# Patient Record
Sex: MALE | Race: WHITE | NOT HISPANIC OR LATINO | Employment: FULL TIME | URBAN - METROPOLITAN AREA
[De-identification: names, ages, dates, MRNs, and addresses within clinical notes are randomized per-mention and may not be internally consistent; named-entity substitution may affect disease eponyms.]

---

## 2022-06-16 ENCOUNTER — TRANSFERRED RECORDS (OUTPATIENT)
Dept: HEALTH INFORMATION MANAGEMENT | Facility: CLINIC | Age: 42
End: 2022-06-16

## 2022-06-22 ENCOUNTER — TRANSFERRED RECORDS (OUTPATIENT)
Dept: HEALTH INFORMATION MANAGEMENT | Facility: CLINIC | Age: 42
End: 2022-06-22

## 2022-06-22 LAB
CREATININE (EXTERNAL): 1 MG/DL (ref 0.73–1.18)
GFR ESTIMATED (EXTERNAL): >60 ML/MIN/1.73M2
GLUCOSE (EXTERNAL): 94 MG/DL (ref 70–100)
POTASSIUM (EXTERNAL): 3.9 MMOL/L (ref 3.5–5.1)

## 2022-06-24 RX ORDER — ASPIRIN 325 MG
325 TABLET ORAL DAILY
Status: ON HOLD | COMMUNITY
End: 2022-06-30

## 2022-06-24 RX ORDER — COVID-19 ANTIGEN TEST
440 KIT MISCELLANEOUS DAILY PRN
Status: ON HOLD | COMMUNITY
End: 2022-06-30

## 2022-06-28 ENCOUNTER — ANESTHESIA EVENT (OUTPATIENT)
Dept: SURGERY | Facility: CLINIC | Age: 42
DRG: 460 | End: 2022-06-28
Payer: COMMERCIAL

## 2022-06-29 ENCOUNTER — APPOINTMENT (OUTPATIENT)
Dept: RADIOLOGY | Facility: CLINIC | Age: 42
DRG: 460 | End: 2022-06-29
Attending: ORTHOPAEDIC SURGERY
Payer: COMMERCIAL

## 2022-06-29 ENCOUNTER — ANESTHESIA (OUTPATIENT)
Dept: SURGERY | Facility: CLINIC | Age: 42
DRG: 460 | End: 2022-06-29
Payer: COMMERCIAL

## 2022-06-29 ENCOUNTER — HOSPITAL ENCOUNTER (INPATIENT)
Facility: CLINIC | Age: 42
LOS: 1 days | Discharge: HOME | DRG: 460 | End: 2022-06-30
Attending: ORTHOPAEDIC SURGERY | Admitting: ORTHOPAEDIC SURGERY
Payer: COMMERCIAL

## 2022-06-29 PROBLEM — M54.16 LUMBAR RADICULOPATHY: Status: ACTIVE | Noted: 2022-06-29

## 2022-06-29 PROCEDURE — 250N000011 HC RX IP 250 OP 636: Performed by: ORTHOPAEDIC SURGERY

## 2022-06-29 PROCEDURE — 999N000182 XR SURGERY CARM FLUORO GREATER THAN 5 MIN

## 2022-06-29 PROCEDURE — 87070 CULTURE OTHR SPECIMN AEROBIC: CPT | Performed by: ORTHOPAEDIC SURGERY

## 2022-06-29 PROCEDURE — 99222 1ST HOSP IP/OBS MODERATE 55: CPT | Performed by: FAMILY MEDICINE

## 2022-06-29 PROCEDURE — 250N000011 HC RX IP 250 OP 636: Performed by: ANESTHESIOLOGY

## 2022-06-29 PROCEDURE — 370N000017 HC ANESTHESIA TECHNICAL FEE, PER MIN: Performed by: ORTHOPAEDIC SURGERY

## 2022-06-29 PROCEDURE — 250N000005 HC OR RX SURGIFLO HEMOSTATIC MATRIX 10ML 199102S OPNP: Performed by: ORTHOPAEDIC SURGERY

## 2022-06-29 PROCEDURE — 250N000011 HC RX IP 250 OP 636: Performed by: NURSE ANESTHETIST, CERTIFIED REGISTERED

## 2022-06-29 PROCEDURE — 120N000001 HC R&B MED SURG/OB

## 2022-06-29 PROCEDURE — 0SG30A0 FUSION OF LUMBOSACRAL JOINT WITH INTERBODY FUSION DEVICE, ANTERIOR APPROACH, ANTERIOR COLUMN, OPEN APPROACH: ICD-10-PCS | Performed by: ORTHOPAEDIC SURGERY

## 2022-06-29 PROCEDURE — L8699 PROSTHETIC IMPLANT NOS: HCPCS | Performed by: ORTHOPAEDIC SURGERY

## 2022-06-29 PROCEDURE — C1713 ANCHOR/SCREW BN/BN,TIS/BN: HCPCS | Performed by: ORTHOPAEDIC SURGERY

## 2022-06-29 PROCEDURE — C1762 CONN TISS, HUMAN(INC FASCIA): HCPCS | Performed by: ORTHOPAEDIC SURGERY

## 2022-06-29 PROCEDURE — 710N000010 HC RECOVERY PHASE 1, LEVEL 2, PER MIN: Performed by: ORTHOPAEDIC SURGERY

## 2022-06-29 PROCEDURE — 360N000086 HC SURGERY LEVEL 6 W/ FLUORO, PER MIN: Performed by: ORTHOPAEDIC SURGERY

## 2022-06-29 PROCEDURE — C9290 INJ, BUPIVACAINE LIPOSOME: HCPCS | Performed by: ANESTHESIOLOGY

## 2022-06-29 PROCEDURE — 272N000001 HC OR GENERAL SUPPLY STERILE: Performed by: ORTHOPAEDIC SURGERY

## 2022-06-29 PROCEDURE — 999N000063 XR ABDOMEN PORT 1 VIEWS

## 2022-06-29 PROCEDURE — 258N000003 HC RX IP 258 OP 636: Performed by: ANESTHESIOLOGY

## 2022-06-29 PROCEDURE — 0SB40ZZ EXCISION OF LUMBOSACRAL DISC, OPEN APPROACH: ICD-10-PCS | Performed by: ORTHOPAEDIC SURGERY

## 2022-06-29 PROCEDURE — 87075 CULTR BACTERIA EXCEPT BLOOD: CPT | Performed by: ORTHOPAEDIC SURGERY

## 2022-06-29 PROCEDURE — 272N000004 HC RX 272: Performed by: ORTHOPAEDIC SURGERY

## 2022-06-29 PROCEDURE — 999N000141 HC STATISTIC PRE-PROCEDURE NURSING ASSESSMENT: Performed by: ORTHOPAEDIC SURGERY

## 2022-06-29 PROCEDURE — 250N000009 HC RX 250: Performed by: NURSE ANESTHETIST, CERTIFIED REGISTERED

## 2022-06-29 PROCEDURE — 250N000009 HC RX 250: Performed by: ANESTHESIOLOGY

## 2022-06-29 PROCEDURE — 250N000009 HC RX 250: Performed by: ORTHOPAEDIC SURGERY

## 2022-06-29 PROCEDURE — 258N000003 HC RX IP 258 OP 636: Performed by: NURSE ANESTHETIST, CERTIFIED REGISTERED

## 2022-06-29 PROCEDURE — 87205 SMEAR GRAM STAIN: CPT | Performed by: ORTHOPAEDIC SURGERY

## 2022-06-29 PROCEDURE — 250N000025 HC SEVOFLURANE, PER MIN: Performed by: ORTHOPAEDIC SURGERY

## 2022-06-29 PROCEDURE — 258N000003 HC RX IP 258 OP 636: Performed by: ORTHOPAEDIC SURGERY

## 2022-06-29 PROCEDURE — 4A1034Z MONITORING OF CENTRAL NERVOUS ELECTRICAL ACTIVITY, PERCUTANEOUS APPROACH: ICD-10-PCS | Performed by: ORTHOPAEDIC SURGERY

## 2022-06-29 PROCEDURE — 0SP00AZ REMOVAL OF INTERBODY FUSION DEVICE FROM LUMBAR VERTEBRAL JOINT, OPEN APPROACH: ICD-10-PCS | Performed by: ORTHOPAEDIC SURGERY

## 2022-06-29 PROCEDURE — 250N000013 HC RX MED GY IP 250 OP 250 PS 637: Performed by: ORTHOPAEDIC SURGERY

## 2022-06-29 DEVICE — GRAFT BONE INFUSE BMP SM 7510200: Type: IMPLANTABLE DEVICE | Site: ABDOMEN | Status: FUNCTIONAL

## 2022-06-29 DEVICE — BONE CHIPS CANCELLOUS 15CC 1-8MM: Type: IMPLANTABLE DEVICE | Site: ABDOMEN | Status: FUNCTIONAL

## 2022-06-29 DEVICE — SCREW BN 25MM 5.5MM NS ENDOSKELETON TAS SPNE INTRBD FS: Type: IMPLANTABLE DEVICE | Site: ABDOMEN | Status: FUNCTIONAL

## 2022-06-29 DEVICE — INTERBODY FUSION DEVICE  7 DEGREE LARGE 10MM
Type: IMPLANTABLE DEVICE | Site: SPINE LUMBAR | Status: FUNCTIONAL
Brand: ENDOSKELETON® TAS NANOLOCK® SURFACE TECHNOLOGY

## 2022-06-29 RX ORDER — HYDROMORPHONE HCL IN WATER/PF 6 MG/30 ML
0.2 PATIENT CONTROLLED ANALGESIA SYRINGE INTRAVENOUS EVERY 5 MIN PRN
Status: DISCONTINUED | OUTPATIENT
Start: 2022-06-29 | End: 2022-06-29 | Stop reason: HOSPADM

## 2022-06-29 RX ORDER — FENTANYL CITRATE 50 UG/ML
50 INJECTION, SOLUTION INTRAMUSCULAR; INTRAVENOUS
Status: DISCONTINUED | OUTPATIENT
Start: 2022-06-29 | End: 2022-06-29 | Stop reason: HOSPADM

## 2022-06-29 RX ORDER — DEXAMETHASONE SODIUM PHOSPHATE 10 MG/ML
INJECTION, SOLUTION INTRAMUSCULAR; INTRAVENOUS PRN
Status: DISCONTINUED | OUTPATIENT
Start: 2022-06-29 | End: 2022-06-29

## 2022-06-29 RX ORDER — IBUPROFEN 200 MG
400-600 TABLET ORAL EVERY 8 HOURS PRN
Status: ON HOLD | COMMUNITY
End: 2022-06-30

## 2022-06-29 RX ORDER — ACETAMINOPHEN 325 MG/1
650 TABLET ORAL EVERY 4 HOURS PRN
Status: DISCONTINUED | OUTPATIENT
Start: 2022-07-02 | End: 2022-06-30 | Stop reason: HOSPADM

## 2022-06-29 RX ORDER — OXYCODONE HYDROCHLORIDE 5 MG/1
5 TABLET ORAL EVERY 4 HOURS PRN
Status: DISCONTINUED | OUTPATIENT
Start: 2022-06-29 | End: 2022-06-30 | Stop reason: HOSPADM

## 2022-06-29 RX ORDER — BUPIVACAINE HYDROCHLORIDE 2.5 MG/ML
INJECTION, SOLUTION INFILTRATION; PERINEURAL
Status: DISCONTINUED
Start: 2022-06-29 | End: 2022-06-29 | Stop reason: WASHOUT

## 2022-06-29 RX ORDER — LORATADINE 10 MG/1
10 TABLET ORAL DAILY PRN
Status: DISCONTINUED | OUTPATIENT
Start: 2022-06-29 | End: 2022-06-30 | Stop reason: HOSPADM

## 2022-06-29 RX ORDER — ONDANSETRON 2 MG/ML
INJECTION INTRAMUSCULAR; INTRAVENOUS PRN
Status: DISCONTINUED | OUTPATIENT
Start: 2022-06-29 | End: 2022-06-29

## 2022-06-29 RX ORDER — ONDANSETRON 2 MG/ML
4 INJECTION INTRAMUSCULAR; INTRAVENOUS EVERY 30 MIN PRN
Status: DISCONTINUED | OUTPATIENT
Start: 2022-06-29 | End: 2022-06-29 | Stop reason: HOSPADM

## 2022-06-29 RX ORDER — ONDANSETRON 4 MG/1
4 TABLET, ORALLY DISINTEGRATING ORAL EVERY 6 HOURS PRN
Status: DISCONTINUED | OUTPATIENT
Start: 2022-06-29 | End: 2022-06-30 | Stop reason: HOSPADM

## 2022-06-29 RX ORDER — PROCHLORPERAZINE MALEATE 10 MG
10 TABLET ORAL EVERY 6 HOURS PRN
Status: DISCONTINUED | OUTPATIENT
Start: 2022-06-29 | End: 2022-06-30 | Stop reason: HOSPADM

## 2022-06-29 RX ORDER — FENTANYL CITRATE 50 UG/ML
25 INJECTION, SOLUTION INTRAMUSCULAR; INTRAVENOUS EVERY 5 MIN PRN
Status: DISCONTINUED | OUTPATIENT
Start: 2022-06-29 | End: 2022-06-29 | Stop reason: HOSPADM

## 2022-06-29 RX ORDER — KETAMINE HYDROCHLORIDE 10 MG/ML
INJECTION INTRAMUSCULAR; INTRAVENOUS PRN
Status: DISCONTINUED | OUTPATIENT
Start: 2022-06-29 | End: 2022-06-29

## 2022-06-29 RX ORDER — MAGNESIUM HYDROXIDE 1200 MG/15ML
LIQUID ORAL PRN
Status: DISCONTINUED | OUTPATIENT
Start: 2022-06-29 | End: 2022-06-29 | Stop reason: HOSPADM

## 2022-06-29 RX ORDER — CEFAZOLIN SODIUM/WATER 2 G/20 ML
2 SYRINGE (ML) INTRAVENOUS
Status: COMPLETED | OUTPATIENT
Start: 2022-06-29 | End: 2022-06-29

## 2022-06-29 RX ORDER — GABAPENTIN 300 MG/1
300 CAPSULE ORAL
Status: COMPLETED | OUTPATIENT
Start: 2022-06-29 | End: 2022-06-29

## 2022-06-29 RX ORDER — AMOXICILLIN 250 MG
1 CAPSULE ORAL 2 TIMES DAILY
Status: DISCONTINUED | OUTPATIENT
Start: 2022-06-29 | End: 2022-06-30 | Stop reason: HOSPADM

## 2022-06-29 RX ORDER — PROPOFOL 10 MG/ML
INJECTION, EMULSION INTRAVENOUS PRN
Status: DISCONTINUED | OUTPATIENT
Start: 2022-06-29 | End: 2022-06-29

## 2022-06-29 RX ORDER — LIDOCAINE 40 MG/G
CREAM TOPICAL
Status: DISCONTINUED | OUTPATIENT
Start: 2022-06-29 | End: 2022-06-29 | Stop reason: HOSPADM

## 2022-06-29 RX ORDER — NALOXONE HYDROCHLORIDE 0.4 MG/ML
0.4 INJECTION, SOLUTION INTRAMUSCULAR; INTRAVENOUS; SUBCUTANEOUS
Status: DISCONTINUED | OUTPATIENT
Start: 2022-06-29 | End: 2022-06-30 | Stop reason: HOSPADM

## 2022-06-29 RX ORDER — SODIUM CHLORIDE, SODIUM LACTATE, POTASSIUM CHLORIDE, CALCIUM CHLORIDE 600; 310; 30; 20 MG/100ML; MG/100ML; MG/100ML; MG/100ML
INJECTION, SOLUTION INTRAVENOUS CONTINUOUS
Status: DISCONTINUED | OUTPATIENT
Start: 2022-06-29 | End: 2022-06-29 | Stop reason: HOSPADM

## 2022-06-29 RX ORDER — ONDANSETRON 2 MG/ML
4 INJECTION INTRAMUSCULAR; INTRAVENOUS EVERY 6 HOURS PRN
Status: DISCONTINUED | OUTPATIENT
Start: 2022-06-29 | End: 2022-06-30 | Stop reason: HOSPADM

## 2022-06-29 RX ORDER — SODIUM CHLORIDE 9 MG/ML
INJECTION, SOLUTION INTRAVENOUS CONTINUOUS
Status: DISCONTINUED | OUTPATIENT
Start: 2022-06-29 | End: 2022-06-30 | Stop reason: HOSPADM

## 2022-06-29 RX ORDER — LIDOCAINE HYDROCHLORIDE 10 MG/ML
INJECTION, SOLUTION INFILTRATION; PERINEURAL PRN
Status: DISCONTINUED | OUTPATIENT
Start: 2022-06-29 | End: 2022-06-29

## 2022-06-29 RX ORDER — MEPERIDINE HYDROCHLORIDE 25 MG/ML
12.5 INJECTION INTRAMUSCULAR; INTRAVENOUS; SUBCUTANEOUS
Status: DISCONTINUED | OUTPATIENT
Start: 2022-06-29 | End: 2022-06-29 | Stop reason: HOSPADM

## 2022-06-29 RX ORDER — ACETAMINOPHEN 325 MG/1
975 TABLET ORAL EVERY 8 HOURS
Status: DISCONTINUED | OUTPATIENT
Start: 2022-06-29 | End: 2022-06-30 | Stop reason: HOSPADM

## 2022-06-29 RX ORDER — BUPIVACAINE HYDROCHLORIDE 5 MG/ML
INJECTION, SOLUTION EPIDURAL; INTRACAUDAL
Status: COMPLETED | OUTPATIENT
Start: 2022-06-29 | End: 2022-06-29

## 2022-06-29 RX ORDER — OXYCODONE HYDROCHLORIDE 5 MG/1
10 TABLET ORAL EVERY 4 HOURS PRN
Status: DISCONTINUED | OUTPATIENT
Start: 2022-06-29 | End: 2022-06-30 | Stop reason: HOSPADM

## 2022-06-29 RX ORDER — MAGNESIUM SULFATE 4 G/50ML
4 INJECTION INTRAVENOUS ONCE
Status: COMPLETED | OUTPATIENT
Start: 2022-06-29 | End: 2022-06-29

## 2022-06-29 RX ORDER — VANCOMYCIN HYDROCHLORIDE 1 G/20ML
INJECTION, POWDER, LYOPHILIZED, FOR SOLUTION INTRAVENOUS
Status: DISCONTINUED
Start: 2022-06-29 | End: 2022-06-29 | Stop reason: WASHOUT

## 2022-06-29 RX ORDER — MULTIVITAMIN,THERAPEUTIC
1 TABLET ORAL DAILY
Status: DISCONTINUED | OUTPATIENT
Start: 2022-06-30 | End: 2022-06-30 | Stop reason: HOSPADM

## 2022-06-29 RX ORDER — ASPIRIN 325 MG
325 TABLET ORAL DAILY
Status: DISCONTINUED | OUTPATIENT
Start: 2022-06-29 | End: 2022-06-30 | Stop reason: HOSPADM

## 2022-06-29 RX ORDER — VANCOMYCIN HYDROCHLORIDE 1 G/20ML
1 INJECTION, POWDER, LYOPHILIZED, FOR SOLUTION INTRAVENOUS
Status: DISCONTINUED | OUTPATIENT
Start: 2022-06-29 | End: 2022-06-29 | Stop reason: HOSPADM

## 2022-06-29 RX ORDER — HYDROMORPHONE HCL IN WATER/PF 6 MG/30 ML
0.2 PATIENT CONTROLLED ANALGESIA SYRINGE INTRAVENOUS
Status: DISCONTINUED | OUTPATIENT
Start: 2022-06-29 | End: 2022-06-30 | Stop reason: HOSPADM

## 2022-06-29 RX ORDER — NALOXONE HYDROCHLORIDE 0.4 MG/ML
0.2 INJECTION, SOLUTION INTRAMUSCULAR; INTRAVENOUS; SUBCUTANEOUS
Status: DISCONTINUED | OUTPATIENT
Start: 2022-06-29 | End: 2022-06-30 | Stop reason: HOSPADM

## 2022-06-29 RX ORDER — OXYCODONE HYDROCHLORIDE 5 MG/1
5 TABLET ORAL EVERY 4 HOURS PRN
Status: DISCONTINUED | OUTPATIENT
Start: 2022-06-29 | End: 2022-06-29 | Stop reason: HOSPADM

## 2022-06-29 RX ORDER — FENTANYL CITRATE 50 UG/ML
INJECTION, SOLUTION INTRAMUSCULAR; INTRAVENOUS PRN
Status: DISCONTINUED | OUTPATIENT
Start: 2022-06-29 | End: 2022-06-29

## 2022-06-29 RX ORDER — CLONIDINE HYDROCHLORIDE 0.1 MG/1
0.1 TABLET ORAL 2 TIMES DAILY PRN
Status: DISCONTINUED | OUTPATIENT
Start: 2022-06-29 | End: 2022-06-30 | Stop reason: HOSPADM

## 2022-06-29 RX ORDER — CEFAZOLIN SODIUM/WATER 2 G/20 ML
2 SYRINGE (ML) INTRAVENOUS SEE ADMIN INSTRUCTIONS
Status: DISCONTINUED | OUTPATIENT
Start: 2022-06-29 | End: 2022-06-29 | Stop reason: HOSPADM

## 2022-06-29 RX ORDER — CEFAZOLIN SODIUM 2 G/100ML
2 INJECTION, SOLUTION INTRAVENOUS EVERY 8 HOURS
Status: COMPLETED | OUTPATIENT
Start: 2022-06-29 | End: 2022-06-30

## 2022-06-29 RX ORDER — HYDROMORPHONE HCL IN WATER/PF 6 MG/30 ML
0.4 PATIENT CONTROLLED ANALGESIA SYRINGE INTRAVENOUS
Status: DISCONTINUED | OUTPATIENT
Start: 2022-06-29 | End: 2022-06-30 | Stop reason: HOSPADM

## 2022-06-29 RX ORDER — BISACODYL 10 MG
10 SUPPOSITORY, RECTAL RECTAL DAILY PRN
Status: DISCONTINUED | OUTPATIENT
Start: 2022-06-29 | End: 2022-06-30 | Stop reason: HOSPADM

## 2022-06-29 RX ORDER — POLYETHYLENE GLYCOL 3350 17 G/17G
17 POWDER, FOR SOLUTION ORAL DAILY
Status: DISCONTINUED | OUTPATIENT
Start: 2022-06-30 | End: 2022-06-30 | Stop reason: HOSPADM

## 2022-06-29 RX ORDER — ONDANSETRON 4 MG/1
4 TABLET, ORALLY DISINTEGRATING ORAL EVERY 30 MIN PRN
Status: DISCONTINUED | OUTPATIENT
Start: 2022-06-29 | End: 2022-06-29 | Stop reason: HOSPADM

## 2022-06-29 RX ORDER — FENTANYL CITRATE 50 UG/ML
25 INJECTION, SOLUTION INTRAMUSCULAR; INTRAVENOUS
Status: DISCONTINUED | OUTPATIENT
Start: 2022-06-29 | End: 2022-06-29

## 2022-06-29 RX ORDER — ACETAMINOPHEN 500 MG
500-1000 TABLET ORAL EVERY 6 HOURS PRN
COMMUNITY

## 2022-06-29 RX ADMIN — CEFAZOLIN SODIUM 2 G: 2 INJECTION, SOLUTION INTRAVENOUS at 23:53

## 2022-06-29 RX ADMIN — FENTANYL CITRATE 25 MCG: 50 INJECTION, SOLUTION INTRAMUSCULAR; INTRAVENOUS at 11:06

## 2022-06-29 RX ADMIN — SODIUM CHLORIDE, POTASSIUM CHLORIDE, SODIUM LACTATE AND CALCIUM CHLORIDE: 600; 310; 30; 20 INJECTION, SOLUTION INTRAVENOUS at 09:50

## 2022-06-29 RX ADMIN — SUGAMMADEX 200 MG: 100 INJECTION, SOLUTION INTRAVENOUS at 09:27

## 2022-06-29 RX ADMIN — PROPOFOL 180 MG: 10 INJECTION, EMULSION INTRAVENOUS at 08:31

## 2022-06-29 RX ADMIN — SODIUM CHLORIDE: 9 INJECTION, SOLUTION INTRAVENOUS at 13:30

## 2022-06-29 RX ADMIN — MAGNESIUM SULFATE HEPTAHYDRATE 4 G: 4 INJECTION, SOLUTION INTRAVENOUS at 06:59

## 2022-06-29 RX ADMIN — SODIUM CHLORIDE, POTASSIUM CHLORIDE, SODIUM LACTATE AND CALCIUM CHLORIDE: 600; 310; 30; 20 INJECTION, SOLUTION INTRAVENOUS at 06:52

## 2022-06-29 RX ADMIN — KETAMINE HYDROCHLORIDE 50 MG: 10 INJECTION, SOLUTION INTRAMUSCULAR; INTRAVENOUS at 08:31

## 2022-06-29 RX ADMIN — HYDROMORPHONE HYDROCHLORIDE 0.2 MG: 0.2 INJECTION, SOLUTION INTRAMUSCULAR; INTRAVENOUS; SUBCUTANEOUS at 11:14

## 2022-06-29 RX ADMIN — LIDOCAINE HYDROCHLORIDE 4 ML: 10 INJECTION, SOLUTION INFILTRATION; PERINEURAL at 08:31

## 2022-06-29 RX ADMIN — ROCURONIUM BROMIDE 50 MG: 10 INJECTION, SOLUTION INTRAVENOUS at 08:31

## 2022-06-29 RX ADMIN — ROCURONIUM BROMIDE 20 MG: 10 INJECTION, SOLUTION INTRAVENOUS at 08:54

## 2022-06-29 RX ADMIN — FENTANYL CITRATE 100 MCG: 50 INJECTION, SOLUTION INTRAMUSCULAR; INTRAVENOUS at 09:00

## 2022-06-29 RX ADMIN — ACETAMINOPHEN 975 MG: 325 TABLET ORAL at 13:29

## 2022-06-29 RX ADMIN — SENNOSIDES AND DOCUSATE SODIUM 1 TABLET: 50; 8.6 TABLET ORAL at 21:13

## 2022-06-29 RX ADMIN — FENTANYL CITRATE 25 MCG: 50 INJECTION, SOLUTION INTRAMUSCULAR; INTRAVENOUS at 10:55

## 2022-06-29 RX ADMIN — PHENYLEPHRINE HYDROCHLORIDE 100 MCG: 10 INJECTION INTRAVENOUS at 09:50

## 2022-06-29 RX ADMIN — HYDROMORPHONE HYDROCHLORIDE 0.5 MG: 1 INJECTION, SOLUTION INTRAMUSCULAR; INTRAVENOUS; SUBCUTANEOUS at 10:40

## 2022-06-29 RX ADMIN — DEXAMETHASONE SODIUM PHOSPHATE 10 MG: 10 INJECTION, SOLUTION INTRAMUSCULAR; INTRAVENOUS at 08:31

## 2022-06-29 RX ADMIN — Medication 2 G: at 08:31

## 2022-06-29 RX ADMIN — GABAPENTIN 300 MG: 300 CAPSULE ORAL at 06:54

## 2022-06-29 RX ADMIN — FENTANYL CITRATE 50 MCG: 50 INJECTION, SOLUTION INTRAMUSCULAR; INTRAVENOUS at 08:31

## 2022-06-29 RX ADMIN — FENTANYL CITRATE 25 MCG: 50 INJECTION, SOLUTION INTRAMUSCULAR; INTRAVENOUS at 10:49

## 2022-06-29 RX ADMIN — CEFAZOLIN SODIUM 2 G: 2 INJECTION, SOLUTION INTRAVENOUS at 16:48

## 2022-06-29 RX ADMIN — ACETAMINOPHEN 975 MG: 325 TABLET ORAL at 21:13

## 2022-06-29 RX ADMIN — MIDAZOLAM 2 MG: 1 INJECTION INTRAMUSCULAR; INTRAVENOUS at 08:20

## 2022-06-29 RX ADMIN — OXYCODONE HYDROCHLORIDE 5 MG: 5 TABLET ORAL at 21:24

## 2022-06-29 RX ADMIN — HYDROMORPHONE HYDROCHLORIDE 0.5 MG: 1 INJECTION, SOLUTION INTRAMUSCULAR; INTRAVENOUS; SUBCUTANEOUS at 09:31

## 2022-06-29 RX ADMIN — OXYCODONE HYDROCHLORIDE 5 MG: 5 TABLET ORAL at 14:06

## 2022-06-29 RX ADMIN — BUPIVACAINE 10 ML: 13.3 INJECTION, SUSPENSION, LIPOSOMAL INFILTRATION at 08:40

## 2022-06-29 RX ADMIN — FENTANYL CITRATE 25 MCG: 50 INJECTION, SOLUTION INTRAMUSCULAR; INTRAVENOUS at 11:00

## 2022-06-29 RX ADMIN — ONDANSETRON 4 MG: 2 INJECTION INTRAMUSCULAR; INTRAVENOUS at 08:31

## 2022-06-29 RX ADMIN — BUPIVACAINE HYDROCHLORIDE 15 ML: 5 INJECTION, SOLUTION EPIDURAL; INTRACAUDAL; PERINEURAL at 08:40

## 2022-06-29 ASSESSMENT — ACTIVITIES OF DAILY LIVING (ADL)
ADLS_ACUITY_SCORE: 19

## 2022-06-29 ASSESSMENT — LIFESTYLE VARIABLES: TOBACCO_USE: 1

## 2022-06-29 NOTE — OP NOTE
[facility] General Surgery Operative Note    Pre-operative diagnosis: History of back pain [Z87.39]   Post-operative diagnosis: Same   Procedure: Procedure(s):  ANTERIOR LUMBAR 5 - SACRAL 1, TRANSFORAMINAL LUMBAR INTERBODY FUSION INTERBODY REMOVAL WITH LUMBAR 5 - SACRAL 1 ANTERIOR LUMBAR INTERBODY FUSION,  SURGICAL EXPOSURE, ANTERIOR APPROACH, WITH WOUND CLOSURE, FOR LUMBAR SPINE SURGERY, BY GENERAL SURGERY    Surgeon: Greg Chen MD   Assistant(s): None   Anesthesia: General with Block    Estimated blood loss: 20 mL   Drains: None   Specimens: ID Type Source Tests Collected by Time Destination   A :  Swab Spine, Lumbar ANAEROBIC BACTERIAL CULTURE ROUTINE, GRAM STAIN, AEROBIC BACTERIAL CULTURE ROUTINE Parvez Lorenz MD 6/29/2022  9:35 AM    B :  Swab Spine, Lumbar ANAEROBIC BACTERIAL CULTURE ROUTINE, GRAM STAIN, AEROBIC BACTERIAL CULTURE ROUTINE Parvez Lorenz MD 6/29/2022  9:39 AM        Implants: None   Findings:  L5-S1 successfully exposed without apparent intraoperative complication   Complications: None   Condition: Stable   Procedure Details: After informed consent was obtained from the patient he was brought to the operating suite and placed the operating table.  Gen. anesthesia was induced and his abdomen was prepped and draped in the usual sterile manner.  A low midline incision was made.   We incised the rectus sheath just to the left of midline and identified the peritoneal sac I retracted the rectus muscle laterally and retracted the peritoneal sac medially that is from the left toward the right.  The Bookwalter retractor was placed.  By palpation we identified L5-S1 in the bifurcation the great vessels this level was exposed with a combination of blunt dissection and cautery and LigaSure once this disc space was cleared a metallic marker was placed in the joint space and an was x-ray obtained.  This showed that we had successfully exposed L5-S1.  Further  dissection was undertaken the vessels were held manually with the Beto retractor.  Dr. Duvall proceded with discectomy removal of prior implant and interbody fusion and placement of integrated screws.  This will be dictated separately.  At the conclusion of the case we placed thrombin-soaked Gelfoam patties between the vessels and the implant.  Hemostasis was meticulous.  The fascia was closed with #1 PDS the subcu was closed with 2-0 Vicryl and 3-0 Stratus fix.     Greg Chen MD

## 2022-06-29 NOTE — INTERVAL H&P NOTE
I have reviewed the surgical (or preoperative) H&P that is linked to this encounter, and examined the patient. There are no significant changes    Clinical Conditions Present on Arrival:  Clinically Significant Risk Factors Present on Admission                   # Overweight: Estimated body mass index is 27.78 kg/m  as calculated from the following:    Height as of this encounter: 1.829 m (6').    Weight as of this encounter: 92.9 kg (204 lb 12.8 oz).

## 2022-06-29 NOTE — ANESTHESIA CARE TRANSFER NOTE
Patient: Josué Canales    Procedure: Procedure(s):  ANTERIOR LUMBAR 5 - SACRAL 1, TRANSFORAMINAL LUMBAR INTERBODY FUSION INTERBODY REMOVAL WITH LUMBAR 5 - SACRAL 1 ANTERIOR LUMBAR INTERBODY FUSION,  SURGICAL EXPOSURE, ANTERIOR APPROACH, WITH WOUND CLOSURE, FOR LUMBAR SPINE SURGERY, BY GENERAL SURGERY       Diagnosis: History of back pain [Z87.39]  Diagnosis Additional Information: No value filed.    Anesthesia Type:   General     Note:    Oropharynx: oropharynx clear of all foreign objects  Level of Consciousness: awake  Oxygen Supplementation: face mask  Level of Supplemental Oxygen (L/min / FiO2): 8  Independent Airway: airway patency satisfactory and stable  Dentition: dentition unchanged  Vital Signs Stable: post-procedure vital signs reviewed and stable    Patient transferred to: PACU    Handoff Report: Identifed the Patient, Identified the Reponsible Provider, Reviewed the pertinent medical history, Discussed the surgical course, Reviewed Intra-OP anesthesia mangement and issues during anesthesia, Set expectations for post-procedure period and Allowed opportunity for questions and acknowledgement of understanding      Vitals:  Vitals Value Taken Time   /101 06/29/22 1040   Temp 36.9  C (98.5  F) 06/29/22 1040   Pulse 83 06/29/22 1042   Resp 9 06/29/22 1042   SpO2 100 % 06/29/22 1042   Vitals shown include unvalidated device data.    Electronically Signed By: CARLOS Hubbard CRNA  June 29, 2022  10:43 AM

## 2022-06-29 NOTE — ANESTHESIA PREPROCEDURE EVALUATION
Anesthesia Pre-Procedure Evaluation    Patient: Josué Canales   MRN: [id number] : 1980        Procedure : Procedure(s):  ANTERIOR LUMBAR 5 - SACRAL 1, TRANSFORAMINAL LUMBAR INTERBODY FUSION INTERBODY REMOVAL WITH LUMBAR 5 - SACRAL 1 ANTERIOR LUMBAR INTERBODY FUSION, POSSIBLE COMBINED WITH POSTERIOR HARDWARE REMOVAL  SURGICAL EXPOSURE, ANTERIOR APPROACH, WITH WOUND CLOSURE, FOR LUMBAR SPINE SURGERY, BY GENERAL SURGERY          Past Medical History:   Diagnosis Date     Factor 5 Leiden mutation, heterozygous (H)       Past Surgical History:   Procedure Laterality Date     BACK SURGERY        No Known Allergies   Social History     Tobacco Use     Smoking status: Current Every Day Smoker     Packs/day: 0.50     Years: 10.00     Pack years: 5.00     Smokeless tobacco: Never Used   Substance Use Topics     Alcohol use: Yes     Comment: occasional      Wt Readings from Last 1 Encounters:   No data found for Wt        Anesthesia Evaluation            ROS/MED HX  ENT/Pulmonary:  - neg pulmonary ROS   (+) tobacco use, Current use,     Neurologic:  - neg neurologic ROS     Cardiovascular:  - neg cardiovascular ROS     METS/Exercise Tolerance:     Hematologic:  - neg hematologic  ROS     Musculoskeletal:  - neg musculoskeletal ROS     GI/Hepatic:  - neg GI/hepatic ROS     Renal/Genitourinary:  - neg Renal ROS     Endo:  - neg endo ROS     Psychiatric/Substance Use:  - neg psychiatric ROS     Infectious Disease:  - neg infectious disease ROS     Malignancy:  - neg malignancy ROS     Other:  - neg other ROS          Physical Exam    Airway  airway exam normal      Mallampati: II   TM distance: > 3 FB   Neck ROM: full   Mouth opening: > 3 cm    Respiratory Devices and Support         Dental  no notable dental history         Cardiovascular   cardiovascular exam normal       Rhythm and rate: regular     Pulmonary   pulmonary exam normal        breath sounds clear to auscultation           OUTSIDE LABS:  CBC: No results  found for: WBC, HGB, HCT, PLT  BMP: No results found for: NA, POTASSIUM, CHLORIDE, CO2, BUN, CR, GLC  COAGS: No results found for: PTT, INR, FIBR  POC: No results found for: BGM, HCG, HCGS  HEPATIC: No results found for: ALBUMIN, PROTTOTAL, ALT, AST, GGT, ALKPHOS, BILITOTAL, BILIDIRECT, CALVIN  OTHER: No results found for: PH, LACT, A1C, RIVER, PHOS, MAG, LIPASE, AMYLASE, TSH, T4, T3, CRP, SED    Anesthesia Plan    ASA Status:  2   NPO Status:  NPO Appropriate    Anesthesia Type: General.     - Airway: ETT   Induction: Intravenous, Propofol.   Maintenance: Balanced.        Consents    Anesthesia Plan(s) and associated risks, benefits, and realistic alternatives discussed. Questions answered and patient/representative(s) expressed understanding.     - Discussed: Risks, Benefits and Alternatives for BOTH SEDATION and the PROCEDURE were discussed     - Discussed with:  Patient      - Extended Intubation/Ventilatory Support Discussed: Yes.         Postoperative Care    Pain management: IV analgesics, Oral pain medications, Peripheral nerve block (Single Shot).   PONV prophylaxis: Ondansetron (or other 5HT-3), Dexamethasone or Solumedrol     Comments:    Other Comments: Peripheral nerve block for post-op analgesia as ordered by surgeon.  TAP block.            Erik Walls MD

## 2022-06-29 NOTE — ANESTHESIA POSTPROCEDURE EVALUATION
Patient: Josué Canales    Procedure: Procedure(s):  ANTERIOR LUMBAR 5 - SACRAL 1, TRANSFORAMINAL LUMBAR INTERBODY FUSION INTERBODY REMOVAL WITH LUMBAR 5 - SACRAL 1 ANTERIOR LUMBAR INTERBODY FUSION,  SURGICAL EXPOSURE, ANTERIOR APPROACH, WITH WOUND CLOSURE, FOR LUMBAR SPINE SURGERY, BY GENERAL SURGERY       Anesthesia Type:  General    Note:  Disposition: Inpatient   Postop Pain Control: Uneventful            Sign Out: Well controlled pain   PONV: No   Neuro/Psych: Uneventful            Sign Out: Acceptable/Baseline neuro status   Airway/Respiratory: Uneventful            Sign Out: Acceptable/Baseline resp. status   CV/Hemodynamics: Uneventful            Sign Out: Acceptable CV status; No obvious hypovolemia; No obvious fluid overload   Other NRE: NONE   DID A NON-ROUTINE EVENT OCCUR? No           Last vitals:  Vitals Value Taken Time   /105 06/29/22 1138   Temp 36.9  C (98.5  F) 06/29/22 1040   Pulse 85 06/29/22 1136   Resp 14 06/29/22 1135   SpO2 98 % 06/29/22 1136   Vitals shown include unvalidated device data.    Electronically Signed By: Erik Walls MD  June 29, 2022  1:12 PM

## 2022-06-29 NOTE — CONSULTS
[facility] MEDICINE CONSULT NOTE   Physician requesting consult: Parvez Lorenz*    Reason for consult: Postoperative medical management of medical co-morbidities as below    Assessment and Plan    Josué Canales is a 42 year old old male with a history of borderline hypertension and tobacco abuse presents for anterior posterior fusion. [facility] service was asked to evaluate patient for postoperative medical management as follows below. Please resume the home medications as reconciled and further noted below with ordered hold parameters.  Vital signs have been stable post-operatively including hemodynamically stable blood pressure and heart rate. Thank you for this consult; we will continue to follow this patient until discharge.    Status post extensive L5-S1 anterior posterior fusion  Routine postoperative cares  PT and OT  Pain control  We will follow with you    Essential hypertension  By description patient has had hypertension for quite some time  States blood pressures typically in the low 140s over mid 90s  Will order as needed clonidine for now  Needs outpatient follow-up with primary    Tobacco abuse  As needed nicotine patch  Patient does not want it scheduled    Factor V Leiden mutation  Sounds like he is a heterozygote  No personal history of DVT or PE  Family history  Resume aspirin when able  SCDs  Ambulate  Risks of anticoagulation at this point do not outweigh benefits    Procedure(s):  ANTERIOR LUMBAR 5 - SACRAL 1, TRANSFORAMINAL LUMBAR INTERBODY FUSION INTERBODY REMOVAL WITH LUMBAR 5 - SACRAL 1 ANTERIOR LUMBAR INTERBODY FUSION,  SURGICAL EXPOSURE, ANTERIOR APPROACH, WITH WOUND CLOSURE, FOR LUMBAR SPINE SURGERY, BY GENERAL SURGERY  Post-operative Day: Day of Surgery  Code status:Full Code       Estimated Blood Loss:  20 mL    Hospital Problem List   No problem-specific Assessment & Plan notes found for this encounter.    Active Problems:    Lumbar  radiculopathy      -Reviewed the patient's preoperative H and P and updated missing elements.  -Home medication reconciliation has been reviewed.  Medications have been ordered as noted from the home list and changes are documented above     HISTORY     Josué Canales is a 42 year old old male with history of hypertension and tobacco abuse who presents for an elective anterior posterior low back fusion.  He is seen postprocedure in the orthopedic unit.  He is doing fine.  He is having no saddle anesthesia no weakness in the extremities no bowel or bladder dysfunction.  He has a history of factor V Leiden mutation and thinks he is a heterozygote.  Has never had a DVT or PE.  His father had a pulmonary embolism.  He takes an aspirin daily normally.  He has held this for surgery.  He does smoke about 1/2-3/4 of a pack per day.  Has for quite some time.  We talked about nicotine replacement.  He would be willing to use the patch as needed but does not want 1 scheduled.  He has chronic lumbar radicular pain which may be a bit better after surgery.  He also is now having anterior and posterior incisional pain.  He has a history of vitamin D deficiency as well.  No history of obstructive sleep apnea diabetes coronary disease or stroke.    Past Medical History     Patient Active Problem List    Diagnosis Date Noted     Lumbar radiculopathy 06/29/2022     Priority: Medium        Surgical History   He  has a past surgical history that includes back surgery.     Past Surgical History:   Procedure Laterality Date     BACK SURGERY         Family History    Reviewed, and father had a pulmonary embolism  Social History    Reviewed, and he  reports that he has been smoking. He has a 5.00 pack-year smoking history. He has never used smokeless tobacco. He reports current alcohol use. He reports previous drug use.  Social History     Tobacco Use     Smoking status: Current Every Day Smoker     Packs/day: 0.50     Years: 10.00     Pack  years: 5.00     Smokeless tobacco: Never Used   Substance Use Topics     Alcohol use: Yes     Comment: occasional       Allergies   No Known Allergies    Prior to Admission Medications      Medications Prior to Admission   Medication Sig Dispense Refill Last Dose     acetaminophen (TYLENOL) 500 MG tablet Take 500-1,000 mg by mouth every 6 hours as needed for mild pain   6/23/2022     aspirin (ASA) 325 MG tablet Take 325 mg by mouth daily   6/21/2022     ibuprofen (ADVIL/MOTRIN) 200 MG tablet Take 400-600 mg by mouth every 8 hours as needed for mild pain   6/15/2022 at Unknown time     naproxen sodium 220 MG capsule Take 440 mg by mouth daily as needed   6/15/2022       Review of Systems   A 12 point comprehensive review of systems was negative except as noted above.    OBJECTIVE         Physical Exam   Temp:  [97.3  F (36.3  C)-98.5  F (36.9  C)] 98.2  F (36.8  C)  Pulse:  [69-97] 75  Resp:  [6-18] 16  BP: (137-170)/() 137/88  SpO2:  [94 %-100 %] 94 %  Body mass index is 27.78 kg/m .  GENERAL:  Alert, appears comfortable, in no acute distress, appears stated age   HEAD:  Normocephalic, without obvious abnormality, atraumatic   EYES:  PERRL, conjunctiva/corneas clear, no scleral icterus, EOM's intact   NECK: Supple, symmetrical, trachea midline   BACK:   Symmetric, no curvature, ROM normal   LUNGS:   Clear to auscultation bilaterally, no rales, rhonchi, or wheezing, symmetric chest rise on inhalation, respirations unlabored   CHEST WALL:  No tenderness or deformity   HEART:  Regular rate and rhythm, S1 and S2 normal, no murmur, rub, or gallop    ABDOMEN:   Soft, non-tender, bowel sounds active all four quadrants, no masses, no organomegaly, no rebound or guarding   EXTREMITIES: Extremities normal, atraumatic, no cyanosis or edema    SKIN: Dry to touch, no exanthems in the visualized areas   NEURO: Alert, oriented x 4, moves all four extremities freely/spontaneously   PSYCH: Cooperative, behavior is appropriate   "        Cardiographics Reviewed Personally By Myself   EKG Results: personally reviewed.     Imaging Reviewed Personally By Myself    Radiology Results:   Recent Results (from the past 24 hour(s))   POC US Guidance Needle Placement    Narrative    Ultrasound was performed as guidance to an anesthesia procedure.  Click   \"PACS images\" hyperlink below to view any stored images.  For specific   procedure details, view procedure note authored by anesthesia.   XR Surgery SALLY  Fluoro G/T 5 Min    Narrative    This exam was marked as non-reportable because it will not be read by a   radiologist or a [facility] non-radiologist provider.         XR Abdomen Port 1 View    Narrative    EXAM: XR ABDOMEN PORT 1 VIEWS  LOCATION: [facility]  DATE/TIME: 6/29/2022 10:56 AM    INDICATION: verify no retained instruments  COMPARISON: None.      Impression    IMPRESSION: Lumbosacral spinal fusion apparatus seen. No other retained foreign bodies.    Findings called to the OR at time of study.        Labs Reviewed Personally By Myself     Results for orders placed or performed during the hospital encounter of 06/29/22 (from the past 24 hour(s))   POC US Guidance Needle Placement    Narrative    Ultrasound was performed as guidance to an anesthesia procedure.  Click   \"PACS images\" hyperlink below to view any stored images.  For specific   procedure details, view procedure note authored by anesthesia.   Gram Stain    Specimen: Spine, Lumbar; Swab   Result Value Ref Range    Gram Stain Result No organisms seen     Gram Stain Result No white blood cells seen    Gram Stain    Specimen: Spine, Lumbar; Swab   Result Value Ref Range    Gram Stain Result No organisms seen     Gram Stain Result No white blood cells seen    XR Surgery SALLY  Fluoro G/T 5 Min    Narrative    This exam was marked as non-reportable because it will not be read by a   radiologist or a [facility] non-radiologist provider.         XR Abdomen Port 1 " View    Narrative    EXAM: XR ABDOMEN PORT 1 VIEWS  LOCATION: [facility]  DATE/TIME: 6/29/2022 10:56 AM    INDICATION: verify no retained instruments  COMPARISON: None.      Impression    IMPRESSION: Lumbosacral spinal fusion apparatus seen. No other retained foreign bodies.    Findings called to the OR at time of study.        Preoperative Labs Reviewed Personally By Myself   White count 7.6 hemoglobin 15.8 MCV 88 platelets 214 sodium 140 potassium 3.9 chloride 104 bicarb 23 BUN 7 creatinine 1 glucose 94    Thank you for this consultation.  Appreciate the opportunity to participate in the care of Josué Canales, please feel free to contact us for any questions or concerns.    Otto Reynolds MD  [facility] Medicine  [facility]  Phone: #[phone]

## 2022-06-29 NOTE — ANESTHESIA PROCEDURE NOTES
TAP Procedure Note    Pre-Procedure   Staff -        Anesthesiologist:  Erik Walls MD       Performed By: anesthesiologist       Location: OR       Procedure Start/Stop Times: 6/29/2022 8:34 AM and 6/29/2022 8:40 AM       Pre-Anesthestic Checklist: patient identified, IV checked, site marked, risks and benefits discussed, informed consent, monitors and equipment checked, pre-op evaluation, at physician/surgeon's request and post-op pain management  Timeout:       Correct Patient: Yes        Correct Procedure: Yes        Correct Site: Yes        Correct Position: Yes        Correct Laterality: Yes        Site Marked: Yes  Procedure Documentation  Procedure: TAP       Laterality: bilateral       Patient Position: supine       Skin prep: Chloraprep       Ultrasound guided       1. Ultrasound was used to identify targeted nerve, plexus, vascular marker, or fascial plane and place a needle adjacent to it in real-time.       2. Ultrasound was used to visualize the spread of anesthetic in close proximity to the above referenced structure.       3. A permanent image is entered into the patient's record.       4. The visualized anatomic structures appeared normal.       5. There were no apparent abnormal pathologic findings.    Assessment/Narrative         The placement was negative for: blood aspirated, painful injection and site bleeding       Paresthesias: No.       Bolus given via needle. no blood aspirated via catheter.        Secured via.        Insertion/Infusion Method: Single Shot       Complications: none    Medication(s) Administered   Bupivacaine 0.5% PF (Infiltration) - Infiltration   15 mL - 6/29/2022 8:40:00 AM  Bupivacaine liposome (Exparel) 1.3% LA inj susp (Infiltration) - Infiltration   10 mL - 6/29/2022 8:40:00 AM  Medication Administration Time: 6/29/2022 8:34 AM

## 2022-06-29 NOTE — OP NOTE
Orthopedic  Operative Note    Pre-operative diagnosis: 1.  Pseudoarthrosis with posterior TLIF cage migration and associated radiculopathy at L5-S1    Post-operative diagnosis: 1.  Pseudoarthrosis with posterior TLIF cage migration and associated radiculopathy at L5-S1    Procedure: 1.  Removal TLIF cage L5-S1   2.  L5-S1 ALIF   3.  Infuse and allograft bone grafting   4.  EMG neuro monitoring    Surgeon: Parvez Lorenz MD; Greg Desai MD    Assistant(s): None    Anesthesia: General endotracheal anesthesia    Estimated blood loss: 20 cc     Drains: None    Specimens: 2 cultures L5-S1 interspace    Indications:                               The patient is a 42-year-old gentleman who is status post L5-S1 TLIF.  He done quite well in the postoperative period.  Unfortunately, around postoperative month 5, he was on a tractor leaning forward when he went over a large bump and felt a pop in his back.  Subsequent x-rays showed posterior migration of the TLIF cage.  He initially was asymptomatic from this, but began to experience significant left lower extremity radicular pain.  I discussed both operative and nonoperative options for the patient, and the patient elected for surgical intervention given the acuity of the cage migration and reasonable concern for progressive symptoms and potential damage to the DRG.    I again reviewed the operative indications, the general and specific risks, benefits, and rehabilitation issues. Details of the procedure and postoperative recovery is highlighted. Patient and attending family appear to understand the issues and express their consent proceed.     Findings: Mobile TLIF cage    Complications: None     Procedure Detail: The patient was evaluated in the preoperative holding area and was given opportunity to ask questions regarding the procedure in detail.  The patient did provide formal informed consent to proceed with surgical intervention.  The surgeon's initials were  placed on the patient's abdomen.  The patient was then brought back to the operative suite on a gurney.  The patient was inducted under general anesthesia by our anesthesia colleagues.  A Amato catheter was placed, as well as a arterial line.  The anesthesia team did perform a tap block prior to intervening.  Patient was then placed supine onto a regular OR table, which was then extended at the hip to allow for easier exposure.  The patient was then prepped and draped in the usual sterile fashion.     A preoperative pause was performed to identify the correct patient, laterality, and procedure to be performed as well as administration of perioperative antibiotics.    A lateral fluoroscopic image was obtained to confirm that there was no new migration of the cage.  A midline vertical incision was performed by Dr. Chen, and details of the surgical approach will be included in his operative note.  Once we obtained localization film confirming that we had marked the L5-S1 interspace, I started with the formal ALIF procedure at L5-S1.  I made an annulotomy using a Bovie, and then mobilized the annulus using a sharp Irwin.    There is no evidence of gross purulence, but I did obtain 2 cultures.  I used a series of curettes to clear the disc around the TLIF implant.  The TLIF implant was reasonably mobile, and I was able to gently extract this using a blunt hook and a slap hammer. The endplate was then cleared of any free cartilage and residual pseudoarthrosis fusion bone, and rasp trials were used to size the implant. I found a size 10 with 7 degrees of lordosis implant and the large footprint to be an excellent fit without stressing the disc space to the point of causing strain on the posterior screws.  I then thoroughly irrigated the disc space, and removed any loose fragments of disc.    I placed a small amount of DuraSeal over the void along the posterior annulus created by the TLIF cage to prevent retrograde  migration of the infuse.  I packed the final implant with a small infuse and allograft, and impacted the final implant into place.  I confirmed appropriate positioning with crosstable lateral imaging.  I then placed 2 upgoing and 1 downgoing screw to secure the implant in place.  EMG neuro monitoring was quiet throughout the procedure.     Once final fluoroscopic AP and lateral imaging was obtained, I placed vancomycin powder over the disc spaces.  The wound was then closed, which will be detailed in Dr. Chen's note.  The patient tolerated the anterior portion of the procedure without complication.  Sponge and needle counts were correct at the end.             Condition: Stable     Weight bearing status: Weight bearing as tolerated     Activity:      Anticoagulation plan:    Plan:      Parvez Lorenz MD  [facility]  Date:  6/29/2022  10:11 AM   Activity as tolerated  Patient may move about with assist as indicated or with supervision.  No heavy lifting twisting or bending.    Ambulation and mechanical prophylaxis.    The patient will be transferred to the floor once he clears PACU criteria.  He will receive postoperative Ancef for antibiotic prophylaxis.  He will likely discharge home tomorrow once he clears PT criteria.  He will mobilize for DVT prophylaxis.  No heavy lifting twisting or bending for a full 3 months.  We will see the patient back in clinic in 2 weeks for a formal 2-week follow-up visit.

## 2022-06-29 NOTE — PHARMACY-ADMISSION MEDICATION HISTORY
Pharmacy Note - Admission Medication History    Pertinent Provider Information: n/a   ______________________________________________________________________    Prior To Admission (PTA) med list completed and updated in EMR.       PTA Med List   Medication Sig Last Dose     acetaminophen (TYLENOL) 500 MG tablet Take 500-1,000 mg by mouth every 6 hours as needed for mild pain 6/23/2022     aspirin (ASA) 325 MG tablet Take 325 mg by mouth daily 6/21/2022     ibuprofen (ADVIL/MOTRIN) 200 MG tablet Take 400-600 mg by mouth every 8 hours as needed for mild pain 6/15/2022 at Unknown time     naproxen sodium 220 MG capsule Take 440 mg by mouth daily as needed 6/15/2022       Information source(s): Patient and CareEverywhere/[facility]    Method of interview communication: in-person    Patient was asked about OTC/herbal products specifically.  PTA med list reflects this.    Based on the pharmacist's assessment, the PTA med list information appears reliable    Allergies were reviewed, assessed, and updated with the patient.      Patient does not use any multi-dose medications prior to admission.     Thank you for the opportunity to participate in the care of this patient.      Chana Navas RPH     6/29/2022     6:59 AM

## 2022-06-29 NOTE — ANESTHESIA PROCEDURE NOTES
Airway       Patient location during procedure: OR       Procedure Start/Stop Times: 6/29/2022 8:34 AM  Staff -        CRNA: Crystal Guo APRN CRNA       Performed By: CRNA  Consent for Airway        Urgency: elective  Indications and Patient Condition       Indications for airway management: orlin-procedural       Induction type:intravenous       Mask difficulty assessment: 1 - vent by mask    Final Airway Details       Final airway type: endotracheal airway       Successful airway: ETT - single  Endotracheal Airway Details        ETT size (mm): 8.0       Cuffed: yes       Successful intubation technique: direct laryngoscopy       DL Blade Type: [place] 2       Grade View of Cords: 1       Adjucts: stylet and tooth guard       Position: Right       Measured from: lips       Secured at (cm): 23       Bite block used: None    Post intubation assessment        Placement verified by: capnometry, equal breath sounds and chest rise        Number of attempts at approach: 1       Number of other approaches attempted: 0       Secured with: silk tape       Ease of procedure: easy       Dentition: Intact    Medication(s) Administered   Medication Administration Time: 6/29/2022 8:34 AM

## 2022-06-29 NOTE — PLAN OF CARE
Patient vital signs are at baseline: No,  Reason:  Patient's BP was elevated in the 170s/90s today. Hospitalist consulted.   Patient able to ambulate as they were prior to admission or with assist devices provided by therapies during their stay:  Yes  Patient MUST void prior to discharge:  Yes  Patient able to tolerate oral intake:  No,  Reason:  Hypoactive bowel sounds.   Pain has adequate pain control using Oral analgesics:  Yes. Oxycodone given to help manage.   Does patient have an identified :  Yes  Has goal D/C date and time been discussed with patient:  Yes    Randolph Treviño RN

## 2022-06-30 ENCOUNTER — APPOINTMENT (OUTPATIENT)
Dept: RADIOLOGY | Facility: CLINIC | Age: 42
DRG: 460 | End: 2022-06-30
Attending: ORTHOPAEDIC SURGERY
Payer: COMMERCIAL

## 2022-06-30 ENCOUNTER — APPOINTMENT (OUTPATIENT)
Dept: OCCUPATIONAL THERAPY | Facility: CLINIC | Age: 42
DRG: 460 | End: 2022-06-30
Attending: ORTHOPAEDIC SURGERY
Payer: COMMERCIAL

## 2022-06-30 ENCOUNTER — APPOINTMENT (OUTPATIENT)
Dept: PHYSICAL THERAPY | Facility: CLINIC | Age: 42
DRG: 460 | End: 2022-06-30
Attending: ORTHOPAEDIC SURGERY
Payer: COMMERCIAL

## 2022-06-30 VITALS
HEIGHT: 72 IN | BODY MASS INDEX: 27.74 KG/M2 | SYSTOLIC BLOOD PRESSURE: 130 MMHG | RESPIRATION RATE: 18 BRPM | TEMPERATURE: 97.6 F | DIASTOLIC BLOOD PRESSURE: 69 MMHG | WEIGHT: 204.8 LBS | OXYGEN SATURATION: 100 % | HEART RATE: 69 BPM

## 2022-06-30 LAB
ANION GAP SERPL CALCULATED.3IONS-SCNC: 6 MMOL/L (ref 5–18)
BUN SERPL-MCNC: 10 MG/DL (ref 8–22)
CALCIUM SERPL-MCNC: 9.7 MG/DL (ref 8.5–10.5)
CHLORIDE BLD-SCNC: 105 MMOL/L (ref 98–107)
CO2 SERPL-SCNC: 28 MMOL/L (ref 22–31)
CREAT SERPL-MCNC: 0.83 MG/DL (ref 0.7–1.3)
ERYTHROCYTE [DISTWIDTH] IN BLOOD BY AUTOMATED COUNT: 12.2 % (ref 10–15)
GFR SERPL CREATININE-BSD FRML MDRD: >90 ML/MIN/1.73M2
GLUCOSE BLD-MCNC: 116 MG/DL (ref 70–125)
GRAM STAIN RESULT: NORMAL
HCT VFR BLD AUTO: 47.8 % (ref 40–53)
HGB BLD-MCNC: 15.6 G/DL (ref 13.3–17.7)
MCH RBC QN AUTO: 29.2 PG (ref 26.5–33)
MCHC RBC AUTO-ENTMCNC: 32.6 G/DL (ref 31.5–36.5)
MCV RBC AUTO: 89 FL (ref 78–100)
PLATELET # BLD AUTO: 246 10E3/UL (ref 150–450)
POTASSIUM BLD-SCNC: 4.3 MMOL/L (ref 3.5–5)
RBC # BLD AUTO: 5.35 10E6/UL (ref 4.4–5.9)
SODIUM SERPL-SCNC: 139 MMOL/L (ref 136–145)
WBC # BLD AUTO: 17.9 10E3/UL (ref 4–11)

## 2022-06-30 PROCEDURE — 36415 COLL VENOUS BLD VENIPUNCTURE: CPT | Performed by: ORTHOPAEDIC SURGERY

## 2022-06-30 PROCEDURE — 99232 SBSQ HOSP IP/OBS MODERATE 35: CPT | Performed by: FAMILY MEDICINE

## 2022-06-30 PROCEDURE — 250N000013 HC RX MED GY IP 250 OP 250 PS 637: Performed by: ORTHOPAEDIC SURGERY

## 2022-06-30 PROCEDURE — 97535 SELF CARE MNGMENT TRAINING: CPT | Mod: GO

## 2022-06-30 PROCEDURE — 97161 PT EVAL LOW COMPLEX 20 MIN: CPT | Mod: GP

## 2022-06-30 PROCEDURE — 999N000065 XR LUMBAR SPINE 2-3 VIEWS

## 2022-06-30 PROCEDURE — 85027 COMPLETE CBC AUTOMATED: CPT | Performed by: ORTHOPAEDIC SURGERY

## 2022-06-30 PROCEDURE — 97166 OT EVAL MOD COMPLEX 45 MIN: CPT | Mod: GO

## 2022-06-30 PROCEDURE — 80048 BASIC METABOLIC PNL TOTAL CA: CPT | Performed by: ORTHOPAEDIC SURGERY

## 2022-06-30 RX ORDER — POLYETHYLENE GLYCOL 3350 17 G/17G
17 POWDER, FOR SOLUTION ORAL DAILY
Qty: 510 G | Refills: 0 | Status: SHIPPED | OUTPATIENT
Start: 2022-06-30

## 2022-06-30 RX ORDER — OXYCODONE HYDROCHLORIDE 5 MG/1
5-10 TABLET ORAL EVERY 4 HOURS PRN
Qty: 28 TABLET | Refills: 0 | Status: SHIPPED | OUTPATIENT
Start: 2022-06-30

## 2022-06-30 RX ORDER — AMOXICILLIN 250 MG
1 CAPSULE ORAL 2 TIMES DAILY
COMMUNITY
Start: 2022-06-30

## 2022-06-30 RX ADMIN — SENNOSIDES AND DOCUSATE SODIUM 1 TABLET: 50; 8.6 TABLET ORAL at 09:27

## 2022-06-30 RX ADMIN — POLYETHYLENE GLYCOL 3350 17 G: 17 POWDER, FOR SOLUTION ORAL at 09:27

## 2022-06-30 RX ADMIN — THERA TABS 1 TABLET: TAB at 09:27

## 2022-06-30 RX ADMIN — OXYCODONE HYDROCHLORIDE 5 MG: 5 TABLET ORAL at 11:55

## 2022-06-30 RX ADMIN — ACETAMINOPHEN 975 MG: 325 TABLET ORAL at 04:49

## 2022-06-30 RX ADMIN — OXYCODONE HYDROCHLORIDE 5 MG: 5 TABLET ORAL at 02:48

## 2022-06-30 ASSESSMENT — ACTIVITIES OF DAILY LIVING (ADL)
ADLS_ACUITY_SCORE: 19

## 2022-06-30 NOTE — PROGRESS NOTES
[facility] MEDICINE  PROGRESS NOTE     Code Status: Full Code  Procedure(s):  ANTERIOR LUMBAR 5 - SACRAL 1, TRANSFORAMINAL LUMBAR INTERBODY FUSION INTERBODY REMOVAL WITH LUMBAR 5 - SACRAL 1 ANTERIOR LUMBAR INTERBODY FUSION,  SURGICAL EXPOSURE, ANTERIOR APPROACH, WITH WOUND CLOSURE, FOR LUMBAR SPINE SURGERY, BY GENERAL SURGERY  1 Day Post-Op  Identification/Summary:     Assessment and Plan:  42 year old old male with a history of borderline hypertension and tobacco abuse presents for anterior posterior fusion. [facility] service was asked to evaluate patient for postoperative medical management.  Doing well postoperatively.  Blood pressure did improve.  Recommend close follow-up with primary care provider after surgical recovery.  Medically cleared for discharge.     Status post extensive L5-S1 anterior posterior fusion  Routine postoperative cares  PT and OT  Pain control  Management per orthopedics.     Essential hypertension  By description patient has had hypertension for quite some time  States blood pressures typically in the low 140s over mid 90s  Will order as needed clonidine for now-which patient did not require.  Needs outpatient follow-up with primary goal of 135/85.     Tobacco abuse  As needed nicotine patch  Patient does not want it scheduled     Factor V Leiden mutation  Anticoagulation  Sounds like he is a heterozygote  No personal history of DVT or PE  Family history  Resume aspirin when able  SCDs  Ambulate  Risks of anticoagulation at this point do not outweigh benefits    COVID-19 PCR negative from 6/27/2022  Noted.  Standard precautions.    Fluids: Saline lock  Pain meds: Per orthopedics  Therapy: Per orthopedics  Amato:Not present  Current Diet  Orders Placed This Encounter      Advance Diet as Tolerated: Regular Diet Adult      Discharge Instruction - Regular Diet Adult    Supplements  None    Barriers to Discharge: Medically cleared for discharge.    Disposition:  Discharge med rec reviewed and our area of responsibility was addressed.  Should follow-up with primary care provider in 1 to 2 months after surgical recovery to monitor blood pressure.    Interval History/Subjective:  Patient doing well.  Pain under good control.  Postoperative blood pressure elevations did improve and this morning he is down to 130/69.  Notes that he typically is in the 140s over 90s systolically.  Verbalized his understanding of the importance of closely following his blood pressure and addressing if persistent issue.  Questions answered to verbalized satisfaction.    Physical Exam/Objective:  Temp:  [97.3  F (36.3  C)-98.2  F (36.8  C)] 97.6  F (36.4  C)  Pulse:  [65-83] 69  Resp:  [6-18] 18  BP: (124-170)/() 130/69  SpO2:  [94 %-100 %] 100 %  Wt Readings from Last 4 Encounters:   06/29/22 92.9 kg (204 lb 12.8 oz)     Body mass index is 27.78 kg/m .    Constitutional: awake, alert, cooperative, no apparent distress, and appears stated age  ENT: Normocephalic, without obvious abnormality, atraumatic, external ears without lesions, oral pharynx with moist mucous membranes, tonsils without erythema or exudates, gums normal and good dentition.  Respiratory: No increased work of breathing, good air exchange, clear to auscultation bilaterally, no crackles or wheezing  Cardiovascular: Normal apical impulse, regular rate and rhythm, normal S1 and S2, no S3 or S4, and no murmur noted  GI: No scars, normal bowel sounds, soft, non-distended, non-tender, no masses palpated, no hepatosplenomegally  Skin: normal skin color, texture, turgor, no redness, warmth, or swelling, and no rashes  Musculoskeletal: There is no redness, warmth, or swelling of the joints.  Motor strength is 5 out of 5 all extremities bilaterally.  Tone is normal. no lower extremity pitting edema present  Neurologic: Cranial nerves II-XII are grossly intact. Sensory:  Sensory intact  Neuropsychiatric: General: normal, calm and normal  "eye contact Level of consciousness: alert / normal Affect: normal Orientation: oriented to self, place, time and situation Memory and insight: normal, memory for past and recent events intact and thought process normal      Medications:   Personally Reviewed.  Medications     bupivacaine liposome (EXPAREL) LA inj was given in the infiltration site to produce post-op analgesia. Duration of action is up to 72 hours. Other \"hilary\" meds should not be given for 96 hours except for lidocaine 4% patch. This is for INFORMATION ONLY.       sodium chloride Stopped (06/30/22 0200)       acetaminophen  975 mg Oral Q8H     [Held by provider] aspirin  325 mg Oral Daily     multivitamin, therapeutic  1 tablet Oral Daily     polyethylene glycol  17 g Oral Daily     senna-docusate  1 tablet Oral BID       Data reviewed today: I personally reviewed all new medications, labs, imaging/diagnostics reports over the past 24 hours. Pertinent findings include:    Imaging:   Recent Results (from the past 24 hour(s))   XR Lumbar Spine 2/3 Views    Narrative    EXAM: XR LUMBAR SPINE 2-3 VIEWS  LOCATION: [facility]  DATE/TIME: 6/30/2022 10:16 AM    INDICATION: Status post anterior lumbar interbody fusion.  COMPARISON: Lumbar MRI 05/15/2017.  TECHNIQUE: CR Lumbar Spine.      Impression    IMPRESSION: Five lumbar type vertebral bodies utilized for counting purposes. Since previous MRI, interval procedural changes with anterior/posterior fusion L5-S1. Posterior hardware is intact without lucency about the transpedicular body screws.   Anterior interbody fusion L5-S1 is in satisfactory position. Very mild leftward curve at L3-L4. Satisfactory height. 1.5 mm retrolisthesis L4-L5. Mild interspace narrowing/osteophytes in the lumbar spine. Satisfactory sacral alignment. Sacral neural   foramina are intact. SI joints are symmetric. No displaced lower rib fractures. No acute process identified and no complication to the " procedural changes is identified.       Labs:  XR Lumbar Spine 2/3 Views   Final Result   IMPRESSION: Five lumbar type vertebral bodies utilized for counting purposes. Since previous MRI, interval procedural changes with anterior/posterior fusion L5-S1. Posterior hardware is intact without lucency about the transpedicular body screws.    Anterior interbody fusion L5-S1 is in satisfactory position. Very mild leftward curve at L3-L4. Satisfactory height. 1.5 mm retrolisthesis L4-L5. Mild interspace narrowing/osteophytes in the lumbar spine. Satisfactory sacral alignment. Sacral neural    foramina are intact. SI joints are symmetric. No displaced lower rib fractures. No acute process identified and no complication to the procedural changes is identified.      XR Abdomen Port 1 View   Final Result   IMPRESSION: Lumbosacral spinal fusion apparatus seen. No other retained foreign bodies.      Findings called to the OR at time of study.       XR Surgery SALLY  Fluoro G/T 5 Min   Final Result      POC US Guidance Needle Placement   Final Result        Recent Results (from the past 24 hour(s))   CBC with platelets    Collection Time: 06/30/22  6:31 AM   Result Value Ref Range    WBC Count 17.9 (H) 4.0 - 11.0 10e3/uL    RBC Count 5.35 4.40 - 5.90 10e6/uL    Hemoglobin 15.6 13.3 - 17.7 g/dL    Hematocrit 47.8 40.0 - 53.0 %    MCV 89 78 - 100 fL    MCH 29.2 26.5 - 33.0 pg    MCHC 32.6 31.5 - 36.5 g/dL    RDW 12.2 10.0 - 15.0 %    Platelet Count 246 150 - 450 10e3/uL   Basic metabolic panel    Collection Time: 06/30/22  6:31 AM   Result Value Ref Range    Sodium 139 136 - 145 mmol/L    Potassium 4.3 3.5 - 5.0 mmol/L    Chloride 105 98 - 107 mmol/L    Carbon Dioxide (CO2) 28 22 - 31 mmol/L    Anion Gap 6 5 - 18 mmol/L    Urea Nitrogen 10 8 - 22 mg/dL    Creatinine 0.83 0.70 - 1.30 mg/dL    Calcium 9.7 8.5 - 10.5 mg/dL    Glucose 116 70 - 125 mg/dL    GFR Estimate >90 >60 mL/min/1.73m2       Pending Labs:  Unresulted Labs Ordered  in the Past 30 Days of this Admission     Date and Time Order Name Status Description    6/29/2022  9:40 AM Swab Aerobic Bacterial Culture Routine Preliminary     6/29/2022  9:40 AM Anaerobic Bacterial Culture Routine In process     6/29/2022  9:38 AM Swab Aerobic Bacterial Culture Routine Preliminary     6/29/2022  9:38 AM Anaerobic Bacterial Culture Routine In process           Yosef Pryor MD  [facility]  Phone: #[phone]

## 2022-06-30 NOTE — PROGRESS NOTES
[facility] Progress Note      Post-operative Day: 1 Day Post-Op    Procedure(s):  ANTERIOR LUMBAR 5 - SACRAL 1, TRANSFORAMINAL LUMBAR INTERBODY FUSION INTERBODY REMOVAL WITH LUMBAR 5 - SACRAL 1 ANTERIOR LUMBAR INTERBODY FUSION,  SURGICAL EXPOSURE, ANTERIOR APPROACH, WITH WOUND CLOSURE, FOR LUMBAR SPINE SURGERY, BY GENERAL SURGERY      Subjective:    No acute events overnight.  Moderate anterior incisional pain, otherwise doing well.  Passing flatus.  Leg pain gone.  Pain: moderate      Objective:  Blood pressure 124/84, pulse 65, temperature 97.4  F (36.3  C), temperature source Oral, resp. rate 18, height 1.829 m (6'), weight 92.9 kg (204 lb 12.8 oz), SpO2 96 %.    Patient Vitals for the past 24 hrs:   BP Temp Temp src Pulse Resp SpO2   06/29/22 2340 124/84 97.4  F (36.3  C) Oral 65 18 96 %   06/29/22 2250 124/59 97.7  F (36.5  C) Oral 80 17 95 %   06/29/22 1850 (!) 150/92 98.2  F (36.8  C) Oral 69 17 97 %   06/29/22 1450 137/88 98.2  F (36.8  C) Oral 75 16 94 %   06/29/22 1350 (!) 160/99 97.3  F (36.3  C) Oral 69 16 99 %   06/29/22 1250 (!) 154/87 98.1  F (36.7  C) Oral -- 18 98 %   06/29/22 1220 (!) 156/85 97.6  F (36.4  C) Oral 80 18 98 %   06/29/22 1150 (!) 170/90 97.6  F (36.4  C) Oral 83 13 94 %   06/29/22 1130 (!) 152/107 -- -- 79 12 99 %   06/29/22 1120 (!) 164/101 -- -- 80 (!) 6 97 %   06/29/22 1110 (!) 168/102 -- -- 92 17 96 %   06/29/22 1100 (!) 166/105 -- -- 84 10 98 %   06/29/22 1050 (!) 158/114 -- -- 81 10 100 %   06/29/22 1040 (!) 145/101 98.5  F (36.9  C) Temporal 97 10 98 %       Wt Readings from Last 4 Encounters:   06/29/22 92.9 kg (204 lb 12.8 oz)       Output by Drain (mL) 06/28/22 0700 - 06/28/22 1459 06/28/22 1500 - 06/28/22 2259 06/28/22 2300 - 06/29/22 0659 06/29/22 0700 - 06/29/22 1459 06/29/22 1500 - 06/29/22 2259 06/29/22 2300 - 06/30/22 0659 06/30/22 0700 - 06/30/22 0711   Patient has no LDAs of requested type attached.        Motor function, sensation, and circulation  intact   Yes  Wound status: incisions are clean dry and intact. Yes    Pertinent Labs   Lab Results: personally reviewed.     No results for input(s): INR, HGB, HCT, MCV, PLT, NA, CRP in the last 13210 hours.    Invalid input(s):  WBC, K, GLU, SEDRATE    Plan: Anticoagulation protocol: Mechanical            Pain medications:  scopainmedication: oxycodone and tylenol            Weight bearing status:  WBAT, no heavy lifting, twisting, or bending            Brace: None            Disposition: Home today after cleared by PT, follow-up in 2 weeks            Continue cares and rehabilitation     Report completed by:  Parvez Lorenz MD  Date: 6/30/2022  Time: 7:11 AM

## 2022-06-30 NOTE — DISCHARGE SUMMARY
[facility] Discharge Summary                                  [facility]     ALONSO ALMAGUER [id number]   Age: 42 year old  PCP: System, Provider Not In, None 1980     Date of Admission:  6/29/2022  Date of Discharge::  6/30/2022  Discharge Provider:  Андрей Higgins NP    Code status:  Full Code    Admission Information:  Admission Diagnosis:  History of back pain [Z87.39]  Lumbar radiculopathy [M54.16]    Post-Operative Day: 1 Day Post-Op     Reason for admission:  The patient was admitted for the following:Procedure(s) (LRB):  ANTERIOR LUMBAR 5 - SACRAL 1, TRANSFORAMINAL LUMBAR INTERBODY FUSION INTERBODY REMOVAL WITH LUMBAR 5 - SACRAL 1 ANTERIOR LUMBAR INTERBODY FUSION, (N/A)  SURGICAL EXPOSURE, ANTERIOR APPROACH, WITH WOUND CLOSURE, FOR LUMBAR SPINE SURGERY, BY GENERAL SURGERY (N/A)    Active Problems:    Lumbar radiculopathy      Allergies:  Patient has no known allergies.    Following the procedure noted above the patient was transferred to the post-op floor and started on:    Therapy:  physical therapy and occupational therapy  Anticoagulation Plan: scoanticoagulationlist2: Mechanical and/or ambulation . May resume aspirin after 6 weeks.   Pain Management: scopainmedication: oxycodone and tylenol  Weight bearing status: Weight bearing as tolerated     The patient was followed by Orthopedics during the inpatient treatment course:  Complications:  Elevated WBC count POD #1. 17.9. Dr. Lorenz notified, no need for additional testing. Patient should monitor for signs of infection written in discharge orders.   Additional consultations:  [facility] [place] [facility]     Pertinent Labs   Lab Results: personally reviewed.     Recent Labs   Lab Test 06/30/22  0631   HGB 15.6   HCT 47.8   MCV 89                Discharge Information:  Condition at discharge: Stable  Discharge destination:  Discharged to home     Medications at discharge:  Current Discharge Medication List      START taking these  medications    Details   oxyCODONE (ROXICODONE) 5 MG tablet Take 1-2 tablets (5-10 mg) by mouth every 4 hours as needed for moderate to severe pain or breakthrough pain Take 1 pill for moderate pain (4-6/10) and 2 pills for severe pain (7-10/10). Alternate with Tylenol. Maximum 8 pills per day.  Qty: 28 tablet, Refills: 0      polyethylene glycol (MIRALAX) 17 GM/Dose powder Take 17 g by mouth daily Take while taking opioid medications and until bowels are back to normal routine. Hold for loose stools  Qty: 510 g, Refills: 0      senna-docusate (SENOKOT-S/PERICOLACE) 8.6-50 MG tablet Take 1 tablet by mouth 2 times daily Take while taking opioid medications and until bowels are back to normal routine. Hold for loose stools         CONTINUE these medications which have NOT CHANGED    Details   acetaminophen (TYLENOL) 500 MG tablet Take 500-1,000 mg by mouth every 6 hours as needed for mild pain         STOP taking these medications       aspirin (ASA) 325 MG tablet Comments:   Reason for Stopping:         ibuprofen (ADVIL/MOTRIN) 200 MG tablet Comments:   Reason for Stopping:         naproxen sodium 220 MG capsule Comments:   Reason for Stopping:                          Follow-Up Care:  Patient should be seen in the office in 10-14 days by the Orthopedic Surgeon/Physician Assistant.  Call [phone] for appointment or questions.    It was my pleasure to take care of the above patient.  Андрей Higgins NP

## 2022-06-30 NOTE — PLAN OF CARE
Patient vital signs are at baseline: No. Patient's BP was 130/69. This is his baseline at home.  Patient able to ambulate as they were prior to admission or with assist devices provided by therapies during their stay:  Yes  Patient MUST void prior to discharge:  Yes  Patient able to tolerate oral intake:  Yes  Pain has adequate pain control using Oral analgesics:  Yes  Does patient have an identified :  Yes  Has goal D/C date and time been discussed with patient:  Yes  Randolph Treviño RN

## 2022-06-30 NOTE — PROGRESS NOTES
A&Ox4. Up SBA. Given PRN oxy x 1 for pain. Urinal voiding. PIV SL. IV abx infused per orders. ML abd dressing, dried drainage, outlined. Neuro's intact. Hourly rounding completed, continuing to monitor.    Blood pressure 124/84, pulse 65, temperature 97.4  F (36.3  C), temperature source Oral, resp. rate 18, height 1.829 m (6'), weight 92.9 kg (204 lb 12.8 oz), SpO2 96 %.    Zayda Thomas RN

## 2022-06-30 NOTE — PROGRESS NOTES
[facility] Progress Note    Post-operative Day: 1 Day Post-Op    Procedure(s):  ANTERIOR LUMBAR 5 - SACRAL 1, TRANSFORAMINAL LUMBAR INTERBODY FUSION INTERBODY REMOVAL WITH LUMBAR 5 - SACRAL 1 ANTERIOR LUMBAR INTERBODY FUSION,  SURGICAL EXPOSURE, ANTERIOR APPROACH, WITH WOUND CLOSURE, FOR LUMBAR SPINE SURGERY, BY GENERAL SURGERY      Subjective:  Jaspreet is seated, dressed in his chair today. He is doing well.   No acute events overnight.  Moderate anterior incisional pain, otherwise doing well.  Passing flatus.  Leg pain gone.  Pain: moderate      Objective:  Blood pressure 130/69, pulse 69, temperature 97.6  F (36.4  C), temperature source Oral, resp. rate 18, height 1.829 m (6'), weight 92.9 kg (204 lb 12.8 oz), SpO2 100 %.    Patient Vitals for the past 24 hrs:   BP Temp Temp src Pulse Resp SpO2   06/30/22 0817 130/69 97.6  F (36.4  C) Oral 69 18 100 %   06/29/22 2340 124/84 97.4  F (36.3  C) Oral 65 18 96 %   06/29/22 2250 124/59 97.7  F (36.5  C) Oral 80 17 95 %   06/29/22 1850 (!) 150/92 98.2  F (36.8  C) Oral 69 17 97 %   06/29/22 1450 137/88 98.2  F (36.8  C) Oral 75 16 94 %   06/29/22 1350 (!) 160/99 97.3  F (36.3  C) Oral 69 16 99 %   06/29/22 1250 (!) 154/87 98.1  F (36.7  C) Oral -- 18 98 %   06/29/22 1220 (!) 156/85 97.6  F (36.4  C) Oral 80 18 98 %   06/29/22 1150 (!) 170/90 97.6  F (36.4  C) Oral 83 13 94 %   06/29/22 1130 (!) 152/107 -- -- 79 12 99 %   06/29/22 1120 (!) 164/101 -- -- 80 (!) 6 97 %   06/29/22 1110 (!) 168/102 -- -- 92 17 96 %   06/29/22 1100 (!) 166/105 -- -- 84 10 98 %   06/29/22 1050 (!) 158/114 -- -- 81 10 100 %   06/29/22 1040 (!) 145/101 98.5  F (36.9  C) Temporal 97 10 98 %       Wt Readings from Last 4 Encounters:   06/29/22 92.9 kg (204 lb 12.8 oz)       Output by Drain (mL) 06/28/22 0700 - 06/28/22 1459 06/28/22 1500 - 06/28/22 2259 06/28/22 2300 - 06/29/22 0659 06/29/22 0700 - 06/29/22 1459 06/29/22 1500 - 06/29/22 2259 06/29/22 2300 - 06/30/22 0659 06/30/22 0700  - 06/30/22 1003   Patient has no LDAs of requested type attached.        Motor function, sensation, and circulation intact   Yes  Wound status: incisions are clean dry and intact. Yes    Pertinent Labs   Lab Results: personally reviewed.   WBC: 17.9. No fever, chills or other signs of infection. Likely elevated related to high immune response post op.     Plan: Anticoagulation protocol: Mechanical            Pain medications:  scopainmedication: oxycodone and tylenol            Weight bearing status:  WBAT, no lifting greater than 10 lbs, twisting, or bending.             Brace: None            Disposition: Home today after cleared by PT, follow-up in 2 weeks            Continue cares and rehabilitation     Report completed by:  Андрей Higgins NP  Date: 6/30/2022

## 2022-06-30 NOTE — PROGRESS NOTES
Occupational Therapy Discharge Summary    Reason for therapy discharge:    All goals and outcomes met, no further needs identified.    Progress towards therapy goal(s). See goals on Care Plan in [facility] electronic health record for goal details.  Goals met    Therapy recommendation(s):    No further therapy is recommended.

## 2022-06-30 NOTE — PROGRESS NOTES
06/30/22 0945   Quick Adds   Type of Visit Initial PT Evaluation   Living Environment   People in Home child(hayes), dependent   Current Living Arrangements house   Home Accessibility stairs to enter home   Number of Stairs, Main Entrance 1   Stair Railings, Main Entrance none   Number of Stairs, Within Home, Primary greater than 10 stairs   Stair Railings, Within Home, Primary railing on left side (ascending)   Self-Care   Equipment Currently Used at Home none   Fall history within last six months no   General Information   Onset of Illness/Injury or Date of Surgery 06/29/22   Referring Physician Dr. Parvez Lorenz   Patient/Family Therapy Goals Statement (PT) Go Home, walk and move without pain   Pertinent History of Current Problem (include personal factors and/or comorbidities that impact the POC) S/P Anterior L5-S1 Fusion   Existing Precautions/Restrictions spinal   Weight-Bearing Status - LLE full weight-bearing   Weight-Bearing Status - RLE full weight-bearing   Transfers   Comment, (Transfers) Standing when writer entered room   Gait/Stairs (Locomotion)   [place] Level (Gait) supervision   Assistive Device (Gait)   (None)   Distance in Feet (Required for LE Total Joints) 150, 200   Pattern (Gait) step-through   Deviations/Abnormal Patterns (Gait) gait speed decreased   Maintains Weight-bearing Status (Gait) able to maintain   Negotiation (Stairs) stairs independence;stairs assistive device;handrail location;number of steps;ascending technique;descending technique   [place] Level (Stairs) supervision   Assistive Device (Stairs)   (None)   Handrail Location (Stairs) none   Number of Steps (Stairs) 4   Ascending Technique (Stairs) step-over-step   Descending Technique (Stairs) step-over-step   Clinical Impression   Criteria for Skilled Therapeutic Intervention Yes, treatment indicated   PT Diagnosis (PT) Impaired functional mobility   Influenced by the following impairments weakness, pain    Functional limitations due to impairments gait, stairs   Clinical Presentation (PT Evaluation Complexity) Stable/Uncomplicated   Clinical Presentation Rationale Pt presents as medically diagnosed   Clinical Decision Making (Complexity) low complexity   Planned Therapy Interventions (PT) home exercise program   Anticipated Equipment Needs at Discharge (PT)   (None)   Risk & Benefits of therapy have been explained evaluation/treatment results reviewed;care plan/treatment goals reviewed;patient   PT Discharge Planning   PT Discharge Recommendation (DC Rec) (S)  home with assist   PT Rationale for DC Rec Support at home and safe ambulation/stair negotiation   Total Evaluation Time   Total Evaluation Time (Minutes) 5   Physical Therapy Goals   PT Frequency One time eval and treatment only   PT Predicted Duration/Target Date for Goal Attainment 06/30/22   PT Goals PT Goal 1   PT: Goal 1 Independent with spine HEP

## 2022-06-30 NOTE — PLAN OF CARE
Physical Therapy Discharge Summary    Reason for therapy discharge:    All goals and outcomes met, no further needs identified.    Progress towards therapy goal(s). See goals on Care Plan in [facility] electronic health record for goal details.  Goals met    Therapy recommendation(s):    Continue home exercise program.

## 2022-06-30 NOTE — PLAN OF CARE
Patient vital signs are at baseline: Yes  Patient able to ambulate as they were prior to admission or with assist devices provided by therapies during their stay:  Yes  Patient MUST void prior to discharge:  Yes  Patient able to tolerate oral intake:  Yes  Pain has adequate pain control using Oral analgesics:  Yes  Does patient have an identified :  Yes  Has goal D/C date and time been discussed with patient:  Yes    Pt ambulating unit with SBA. PVR x2 completed. Pain being managed with PRN pain medication.

## 2022-06-30 NOTE — PROGRESS NOTES
06/30/22 0730   Quick Adds   Type of Visit Initial Occupational Therapy Evaluation   Living Environment   People in Home child(hayes), dependent  (21, 18 and 15 yrs old)   Current Living Arrangements house  (2 levels but can stay on one level.  Laundry in basement.)   Home Accessibility stairs to enter home;stairs within home   Number of Stairs, Main Entrance 1   Stair Railings, Main Entrance none   Number of Stairs, Within Home, Primary greater than 10 stairs   Stair Railings, Within Home, Primary railings on both sides of stairs   Transportation Anticipated family or friend will provide   Living Environment Comments Tub/shower with shower chair, RTS with vanity on the R   Self-Care   Usual Activity Tolerance excellent   Current Activity Tolerance good   General Information   Onset of Illness/Injury or Date of Surgery 06/29/22   Referring Physician Dr. Parvez Lorenz   Patient/Family Therapy Goal Statement (OT) To return home   Cognitive Status Examination   Orientation Status orientation to person, place and time   Visual Perception   Visual Impairment/Limitations   (None)   Bed Mobility   Bed Mobility supine-sit;sit-supine   Supine-Sit [place] (Bed Mobility) supervision;verbal cues   Sit-Supine [place] (Bed Mobility) supervision;verbal cues   Transfers   Transfers sit-stand transfer;bed-chair transfer;toilet transfer;shower transfer   Transfer Skill: Bed to Chair/Chair to Bed   Bed-Chair [place] (Transfers) supervision;verbal cues   Sit-Stand Transfer   Sit-Stand [place] (Transfers) supervision;verbal cues   Shower Transfer   Type (Shower Transfer) lateral   [place] Level (Shower Transfer) supervision;verbal cues   Toilet Transfer   Type (Toilet Transfer) sit-stand;stand-sit   [place] Level (Toilet Transfer) supervision;verbal cues   Balance   Balance Assessment standing balance: dynamic   Balance Comments good   Activities of Daily Living   BADL Assessment/Intervention upper body  dressing;lower body dressing   Upper Body Dressing Assessment/Training   [place] Level (Upper Body Dressing) supervision;verbal cues   Lower Body Dressing Assessment/Training   [place] Level (Lower Body Dressing) supervision;verbal cues   Clinical Impression   Criteria for Skilled Therapeutic Interventions Met (OT) Yes, treatment indicated   OT Diagnosis decreased indep with ADLs due to spine surgery L5-S1 fusion anterior approach.   Assessment of Occupational Performance 1-3 Performance Deficits   Identified Performance Deficits dressing, toileting, trsfs.   Planned Therapy Interventions (OT) ADL retraining   Clinical Decision Making Complexity (OT) moderate complexity   Risk & Benefits of therapy have been explained evaluation/treatment results reviewed;patient   OT Discharge Planning   OT Discharge Recommendation (DC Rec) home with assist   OT Rationale for DC Rec Pt will have assist from his family.  Parents live close by and will visit each day per Pt report.   Total Evaluation Time (Minutes)   Total Evaluation Time (Minutes) 15   OT Goals   Therapy Frequency (OT) Daily   OT Predicted Duration/Target Date for Goal Attainment 06/30/22   OT Goals Upper Body Dressing;Transfers   OT: Upper Body Dressing Independent;Completed   OT: Transfer Independent;Completed

## 2022-07-05 LAB
BACTERIA TISS BX CULT: NO GROWTH
BACTERIA TISS BX CULT: NO GROWTH

## 2022-07-06 LAB
BACTERIA TISS BX CULT: NORMAL
BACTERIA TISS BX CULT: NORMAL

## (undated) DEVICE — PLATE GROUNDING ADULT W/CORD 9165L

## (undated) DEVICE — DRSG PRIMAPORE 03 1/8X6" 66000318

## (undated) DEVICE — ADH SKIN CLOSURE PREMIERPRO EXOFIN 1.0ML 3470

## (undated) DEVICE — CUST PACK ANTERIOR POSTERIOR FUSION SOP5BAPHEA

## (undated) DEVICE — DRSG STERI STRIP 1/2X4" R1547

## (undated) DEVICE — SUTURE PDS 0 60IN CTX+ LOOPED PDP990G

## (undated) DEVICE — NEEDLE SPINAL DISP 18GA X 3.5" QUINCKE 333350

## (undated) DEVICE — SU VICRYL 3-0 MH 27" J322H

## (undated) DEVICE — CATH TRAY FOLEY SURESTEP 16FR DRAIN BAG STATOCK A899916

## (undated) DEVICE — DRAPE SHEET REV FOLD 3/4 9349

## (undated) DEVICE — SYR 30ML LL W/O NDL 302832

## (undated) DEVICE — CUSHION INSERT LG PRONE VIEW JACKSON TABLE

## (undated) DEVICE — ESU PENCIL SMOKE EVAC W/ROCKER SWITCH 0703-047-000

## (undated) DEVICE — IOM CASE FLAT FEE

## (undated) DEVICE — Device

## (undated) DEVICE — GLOVE BIOGEL PI SZ 7.5 40875

## (undated) DEVICE — SUTURE VICRYL+ 2-0 27IN CT-1 UND VCP259H

## (undated) DEVICE — RX SURGIFLO HEMOSTATIC MATRIX 8ML 2991

## (undated) DEVICE — GLOVE SURG PI ULTRA TOUCH M SZ 7-1/2 LF

## (undated) DEVICE — SUCTION MANIFOLD NEPTUNE 2 SYS 1 PORT 702-025-000

## (undated) DEVICE — ESU ELEC BLADE 6" COATED E1450-6

## (undated) DEVICE — GOWN XLG DISP 9545

## (undated) DEVICE — SUTURE SILK 2-0 TIES 30IN SA85H

## (undated) DEVICE — SOL WATER IRRIG 1000ML BOTTLE 2F7114

## (undated) DEVICE — WRAP LUMBAR COMPRESS COLD THERAPY 4632P

## (undated) DEVICE — TOOL DISSECT MIDAS MR8 14CM MATCH HEAD 3MM MR8-14MH30

## (undated) DEVICE — DURASEAL SPINE SEALANT SYSTEM 3ML

## (undated) DEVICE — ESU LIGASURE MARYLAND JAW OPEN VESSEL 23CM LF1923

## (undated) DEVICE — DRSG PRIMAPORE 04X11 3/4"

## (undated) DEVICE — IOM STANDARD SUPPLY FEE

## (undated) DEVICE — DRAPE C-ARMOR 5 SIDED 5523

## (undated) DEVICE — PACK COLD 6 X 10 1-HOUR 0814-0610

## (undated) DEVICE — SUCTION TIP YANKAUER W/O VENT K86

## (undated) DEVICE — SU STRATAFIX MONOCRYL 3-0 SPIRAL PS-2 30CM SXMP1B106

## (undated) DEVICE — TRAP SPECIMAN 5CC-40CC DYND44140

## (undated) RX ORDER — DEXAMETHASONE SODIUM PHOSPHATE 10 MG/ML
INJECTION, SOLUTION INTRAMUSCULAR; INTRAVENOUS
Status: DISPENSED
Start: 2022-06-29

## (undated) RX ORDER — FENTANYL CITRATE 50 UG/ML
INJECTION, SOLUTION INTRAMUSCULAR; INTRAVENOUS
Status: DISPENSED
Start: 2022-06-29

## (undated) RX ORDER — PROPOFOL 10 MG/ML
INJECTION, EMULSION INTRAVENOUS
Status: DISPENSED
Start: 2022-06-29

## (undated) RX ORDER — ONDANSETRON 2 MG/ML
INJECTION INTRAMUSCULAR; INTRAVENOUS
Status: DISPENSED
Start: 2022-06-29

## (undated) RX ORDER — LIDOCAINE HYDROCHLORIDE 10 MG/ML
INJECTION, SOLUTION EPIDURAL; INFILTRATION; INTRACAUDAL; PERINEURAL
Status: DISPENSED
Start: 2022-06-29

## (undated) RX ORDER — DEXAMETHASONE SODIUM PHOSPHATE 4 MG/ML
INJECTION, SOLUTION INTRA-ARTICULAR; INTRALESIONAL; INTRAMUSCULAR; INTRAVENOUS; SOFT TISSUE
Status: DISPENSED
Start: 2022-06-29

## (undated) RX ORDER — FENTANYL CITRATE-0.9 % NACL/PF 10 MCG/ML
PLASTIC BAG, INJECTION (ML) INTRAVENOUS
Status: DISPENSED
Start: 2022-06-29